# Patient Record
Sex: FEMALE | Race: WHITE | NOT HISPANIC OR LATINO | Employment: PART TIME | URBAN - METROPOLITAN AREA
[De-identification: names, ages, dates, MRNs, and addresses within clinical notes are randomized per-mention and may not be internally consistent; named-entity substitution may affect disease eponyms.]

---
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
## 2021-02-02 ENCOUNTER — ON CAMPUS - OUTPATIENT (AMBULATORY)
Dept: URBAN - METROPOLITAN AREA HOSPITAL 2 | Facility: HOSPITAL | Age: 21
End: 2021-02-02
Payer: OTHER GOVERNMENT

## 2021-02-02 VITALS
DIASTOLIC BLOOD PRESSURE: 51 MMHG | OXYGEN SATURATION: 98 % | DIASTOLIC BLOOD PRESSURE: 64 MMHG | DIASTOLIC BLOOD PRESSURE: 80 MMHG | HEART RATE: 97 BPM | HEART RATE: 85 BPM | TEMPERATURE: 98.4 F | HEART RATE: 87 BPM | SYSTOLIC BLOOD PRESSURE: 133 MMHG | DIASTOLIC BLOOD PRESSURE: 78 MMHG | DIASTOLIC BLOOD PRESSURE: 68 MMHG | OXYGEN SATURATION: 100 % | WEIGHT: 133 LBS | HEIGHT: 64 IN | SYSTOLIC BLOOD PRESSURE: 101 MMHG | SYSTOLIC BLOOD PRESSURE: 93 MMHG | SYSTOLIC BLOOD PRESSURE: 115 MMHG | DIASTOLIC BLOOD PRESSURE: 59 MMHG | HEART RATE: 67 BPM | SYSTOLIC BLOOD PRESSURE: 100 MMHG | SYSTOLIC BLOOD PRESSURE: 102 MMHG | RESPIRATION RATE: 16 BRPM | HEART RATE: 96 BPM | DIASTOLIC BLOOD PRESSURE: 69 MMHG | SYSTOLIC BLOOD PRESSURE: 111 MMHG | HEART RATE: 64 BPM | OXYGEN SATURATION: 99 %

## 2021-02-02 DIAGNOSIS — K64.0 FIRST DEGREE HEMORRHOIDS: ICD-10-CM

## 2021-02-02 DIAGNOSIS — K59.00 CONSTIPATION, UNSPECIFIED: ICD-10-CM

## 2021-02-02 PROCEDURE — 45378 DIAGNOSTIC COLONOSCOPY: CPT | Mod: 33 | Performed by: INTERNAL MEDICINE

## 2021-02-02 RX ORDER — LACTULOSE 10 G/15ML
600 SOLUTION ORAL
Qty: 1800 | Refills: 11 | Status: ACTIVE
Start: 2021-02-02

## 2021-02-02 RX ORDER — PRUCALOPRIDE 2 MG/1
2 TABLET, FILM COATED ORAL
Qty: 30 | Refills: 11 | Status: COMPLETED
Start: 2021-02-02 | End: 2021-02-23

## 2021-02-02 RX ORDER — LINACLOTIDE 290 UG/1
290 CAPSULE, GELATIN COATED ORAL
Qty: 30 | Refills: 6 | Status: ACTIVE
Start: 2021-02-02

## 2021-02-23 ENCOUNTER — OFFICE (AMBULATORY)
Dept: URBAN - METROPOLITAN AREA CLINIC 64 | Facility: CLINIC | Age: 21
End: 2021-02-23
Payer: OTHER GOVERNMENT

## 2021-02-23 VITALS
WEIGHT: 140 LBS | HEIGHT: 64 IN | DIASTOLIC BLOOD PRESSURE: 91 MMHG | HEART RATE: 81 BPM | SYSTOLIC BLOOD PRESSURE: 125 MMHG

## 2021-02-23 DIAGNOSIS — K59.00 CONSTIPATION, UNSPECIFIED: ICD-10-CM

## 2021-02-23 PROCEDURE — 99213 OFFICE O/P EST LOW 20 MIN: CPT | Performed by: INTERNAL MEDICINE

## 2021-03-11 ENCOUNTER — PATIENT MESSAGE (OUTPATIENT)
Dept: FAMILY MEDICINE CLINIC | Facility: CLINIC | Age: 21
End: 2021-03-11

## 2021-03-11 DIAGNOSIS — K59.04 CHRONIC IDIOPATHIC CONSTIPATION: Primary | ICD-10-CM

## 2021-03-11 NOTE — TELEPHONE ENCOUNTER
From: Debbie Rose  To: MARGARETH Baldwin  Sent: 3/11/2021 12:54 PM EST  Subject: Referral Request    requesting another gastroenterology referral, have used both visits with dr kim and not satisfied as all he wants to do is push pills that are not workign and trying to get to the real root of the problem. I have researched  and he is an approved provider and have had friends that went to him and have seen great results.    asking for a gastro referral to shira Sesay at   [facility]  [facility] [address], [address]  [email or web address]    [place] [address]    thanks so much

## 2021-05-12 ENCOUNTER — LAB (OUTPATIENT)
Dept: FAMILY MEDICINE CLINIC | Facility: CLINIC | Age: 21
End: 2021-05-12

## 2021-05-12 ENCOUNTER — OFFICE VISIT (OUTPATIENT)
Dept: FAMILY MEDICINE CLINIC | Facility: CLINIC | Age: 21
End: 2021-05-12

## 2021-05-12 VITALS
BODY MASS INDEX: 22.88 KG/M2 | WEIGHT: 134 LBS | HEIGHT: 64 IN | DIASTOLIC BLOOD PRESSURE: 78 MMHG | HEART RATE: 101 BPM | OXYGEN SATURATION: 98 % | SYSTOLIC BLOOD PRESSURE: 118 MMHG | TEMPERATURE: 97.3 F

## 2021-05-12 DIAGNOSIS — Z01.419 ROUTINE GYNECOLOGICAL EXAMINATION: ICD-10-CM

## 2021-05-12 DIAGNOSIS — Z00.00 PREVENTATIVE HEALTH CARE: ICD-10-CM

## 2021-05-12 DIAGNOSIS — G89.29 CHRONIC PAIN OF LEFT KNEE: ICD-10-CM

## 2021-05-12 DIAGNOSIS — Z00.00 PREVENTATIVE HEALTH CARE: Primary | ICD-10-CM

## 2021-05-12 DIAGNOSIS — M25.562 CHRONIC PAIN OF LEFT KNEE: ICD-10-CM

## 2021-05-12 PROCEDURE — 99395 PREV VISIT EST AGE 18-39: CPT | Performed by: NURSE PRACTITIONER

## 2021-05-12 PROCEDURE — 85025 COMPLETE CBC W/AUTO DIFF WBC: CPT | Performed by: NURSE PRACTITIONER

## 2021-05-12 PROCEDURE — 84443 ASSAY THYROID STIM HORMONE: CPT | Performed by: NURSE PRACTITIONER

## 2021-05-12 PROCEDURE — 36415 COLL VENOUS BLD VENIPUNCTURE: CPT

## 2021-05-12 PROCEDURE — 99212 OFFICE O/P EST SF 10 MIN: CPT | Performed by: NURSE PRACTITIONER

## 2021-05-12 PROCEDURE — 80061 LIPID PANEL: CPT | Performed by: NURSE PRACTITIONER

## 2021-05-12 PROCEDURE — 80053 COMPREHEN METABOLIC PANEL: CPT | Performed by: NURSE PRACTITIONER

## 2021-05-12 NOTE — PROGRESS NOTES
"Raman Rose is a 21 y.o. female.     Chief Complaint   Patient presents with   • Annual Exam   • Gynecologic Exam       /78 (BP Location: Left arm, Patient Position: Sitting, Cuff Size: Adult)   Pulse 101   Temp 97.3 °F (36.3 °C) (Skin)   Ht 162.6 cm (64\")   Wt 60.8 kg (134 lb)   SpO2 98%   BMI 23.00 kg/m²     BP Readings from Last 3 Encounters:   05/12/21 118/78   03/20/21 105/74   02/14/21 122/80       Wt Readings from Last 3 Encounters:   05/12/21 60.8 kg (134 lb)   03/20/21 61.2 kg (135 lb) (62 %, Z= 0.30)*   02/14/21 61.2 kg (135 lb) (62 %, Z= 0.31)*     * Growth percentiles are based on [facility] (Girls, 2-20 Years) data.       Pt comes in today for routine physical and pap.   This is her first pap. LMP about 2 weeks ago. On birth control. Has been sexually active in the past, but not currently. Using condoms.     Having some left knee pain. Worse with exercise, lunges. Started about 1.5 months ago. No injuries. No swelling. No popping or locking. Has sharp pains. No pain with walking.        The following portions of the patient's history were reviewed and updated as appropriate: allergies, current medications, past family history, past medical history, past social history, past surgical history and problem list.    Review of Systems    Objective   Physical Exam  Constitutional:       Appearance: She is well-developed.   HENT:      Head: Normocephalic.   Eyes:      Conjunctiva/sclera: Conjunctivae normal.      Pupils: Pupils are equal, round, and reactive to light.   Neck:      Thyroid: No thyromegaly.   Cardiovascular:      Rate and Rhythm: Normal rate and regular rhythm.      Heart sounds: No murmur heard.     Pulmonary:      Effort: Pulmonary effort is normal.      Breath sounds: Normal breath sounds.   Abdominal:      General: Bowel sounds are normal.      Palpations: Abdomen is soft. There is no mass.      Tenderness: There is no abdominal tenderness.   Genitourinary:     Vagina: " Normal.      Cervix: Normal.      Uterus: Normal.    Musculoskeletal:      Cervical back: Neck supple.   Skin:     General: Skin is warm and dry.      Findings: No lesion.   Neurological:      Mental Status: She is alert and oriented to person, place, and time.   Psychiatric:         Behavior: Behavior normal.           Diagnoses and all orders for this visit:    1. Preventative health care (Primary)  -     CBC & Differential; Future  -     Comprehensive Metabolic Panel; Future  -     TSH; Future  -     Lipid Panel; Future  -     IGP,Aptima HPV,Age Gdln; Future    2. Routine gynecological examination  -     IGP,Aptima HPV,Age Gdln; Future    3. Chronic pain of left knee  -     XR Knee 3 View Left; Future    check labs  X-ray knee. Ice, brace, NSAIDs as needed  During this visit for their annual exam, we reviewed their personal history, social history and family history. We went over their medications and all the recommended health maintenance items for their age group. They were given the opportunity to ask questions and discuss other concerns.       Return in about 1 year (around 5/12/2022) for Annual physical.

## 2021-05-13 LAB
ALBUMIN SERPL-MCNC: 4.6 G/DL (ref 3.5–5.2)
ALBUMIN/GLOB SERPL: 1.8 G/DL
ALP SERPL-CCNC: 48 U/L (ref 39–117)
ALT SERPL W P-5'-P-CCNC: 11 U/L (ref 1–33)
ANION GAP SERPL CALCULATED.3IONS-SCNC: 10.8 MMOL/L (ref 5–15)
AST SERPL-CCNC: 18 U/L (ref 1–32)
BASOPHILS # BLD AUTO: 0.05 10*3/MM3 (ref 0–0.2)
BASOPHILS NFR BLD AUTO: 0.8 % (ref 0–1.5)
BILIRUB SERPL-MCNC: 0.7 MG/DL (ref 0–1.2)
BUN SERPL-MCNC: 10 MG/DL (ref 6–20)
BUN/CREAT SERPL: 9.9 (ref 7–25)
CALCIUM SPEC-SCNC: 9.3 MG/DL (ref 8.6–10.5)
CHLORIDE SERPL-SCNC: 104 MMOL/L (ref 98–107)
CHOLEST SERPL-MCNC: 164 MG/DL (ref 0–200)
CO2 SERPL-SCNC: 24.2 MMOL/L (ref 22–29)
CREAT SERPL-MCNC: 1.01 MG/DL (ref 0.57–1)
DEPRECATED RDW RBC AUTO: 45.6 FL (ref 37–54)
EOSINOPHIL # BLD AUTO: 0.09 10*3/MM3 (ref 0–0.4)
EOSINOPHIL NFR BLD AUTO: 1.4 % (ref 0.3–6.2)
ERYTHROCYTE [DISTWIDTH] IN BLOOD BY AUTOMATED COUNT: 12.6 % (ref 12.3–15.4)
GFR SERPL CREATININE-BSD FRML MDRD: 70 ML/MIN/1.73
GLOBULIN UR ELPH-MCNC: 2.5 GM/DL
GLUCOSE SERPL-MCNC: 76 MG/DL (ref 65–99)
HCT VFR BLD AUTO: 43.5 % (ref 34–46.6)
HDLC SERPL-MCNC: 96 MG/DL (ref 40–60)
HGB BLD-MCNC: 14.2 G/DL (ref 12–15.9)
IMM GRANULOCYTES # BLD AUTO: 0.02 10*3/MM3 (ref 0–0.05)
IMM GRANULOCYTES NFR BLD AUTO: 0.3 % (ref 0–0.5)
LDLC SERPL CALC-MCNC: 58 MG/DL (ref 0–100)
LDLC/HDLC SERPL: 0.61 {RATIO}
LYMPHOCYTES # BLD AUTO: 0.76 10*3/MM3 (ref 0.7–3.1)
LYMPHOCYTES NFR BLD AUTO: 12.1 % (ref 19.6–45.3)
MCH RBC QN AUTO: 31.9 PG (ref 26.6–33)
MCHC RBC AUTO-ENTMCNC: 32.6 G/DL (ref 31.5–35.7)
MCV RBC AUTO: 97.8 FL (ref 79–97)
MONOCYTES # BLD AUTO: 0.79 10*3/MM3 (ref 0.1–0.9)
MONOCYTES NFR BLD AUTO: 12.6 % (ref 5–12)
NEUTROPHILS NFR BLD AUTO: 4.55 10*3/MM3 (ref 1.7–7)
NEUTROPHILS NFR BLD AUTO: 72.8 % (ref 42.7–76)
NRBC BLD AUTO-RTO: 0 /100 WBC (ref 0–0.2)
PLATELET # BLD AUTO: 242 10*3/MM3 (ref 140–450)
PMV BLD AUTO: 10.9 FL (ref 6–12)
POTASSIUM SERPL-SCNC: 4 MMOL/L (ref 3.5–5.2)
PROT SERPL-MCNC: 7.1 G/DL (ref 6–8.5)
RBC # BLD AUTO: 4.45 10*6/MM3 (ref 3.77–5.28)
SODIUM SERPL-SCNC: 139 MMOL/L (ref 136–145)
TRIGL SERPL-MCNC: 46 MG/DL (ref 0–150)
TSH SERPL DL<=0.05 MIU/L-ACNC: 0.92 UIU/ML (ref 0.27–4.2)
VLDLC SERPL-MCNC: 10 MG/DL (ref 5–40)
WBC # BLD AUTO: 6.26 10*3/MM3 (ref 3.4–10.8)

## 2021-05-14 LAB
AGE GDLN ACOG TESTING: NORMAL
CYTOLOGIST CVX/VAG CYTO: NORMAL
CYTOLOGY CVX/VAG DOC CYTO: NORMAL
CYTOLOGY CVX/VAG DOC THIN PREP: NORMAL
DX ICD CODE: NORMAL
HIV 1 & 2 AB SER-IMP: NORMAL
OTHER STN SPEC: NORMAL
STAT OF ADQ CVX/VAG CYTO-IMP: NORMAL

## 2021-05-18 DIAGNOSIS — M25.562 CHRONIC PAIN OF LEFT KNEE: ICD-10-CM

## 2021-05-18 DIAGNOSIS — G89.29 CHRONIC PAIN OF LEFT KNEE: ICD-10-CM

## 2021-06-01 DIAGNOSIS — G89.29 CHRONIC PAIN OF LEFT KNEE: Primary | ICD-10-CM

## 2021-06-01 DIAGNOSIS — M25.562 CHRONIC PAIN OF LEFT KNEE: Primary | ICD-10-CM

## 2021-06-22 ENCOUNTER — HOSPITAL ENCOUNTER (OUTPATIENT)
Dept: MRI IMAGING | Facility: HOSPITAL | Age: 21
Discharge: HOME | End: 2021-06-22
Admitting: NURSE PRACTITIONER

## 2021-06-22 DIAGNOSIS — G89.29 CHRONIC PAIN OF LEFT KNEE: ICD-10-CM

## 2021-06-22 DIAGNOSIS — M25.562 CHRONIC PAIN OF LEFT KNEE: ICD-10-CM

## 2021-06-22 PROCEDURE — 73721 MRI JNT OF LWR EXTRE W/O DYE: CPT

## 2021-08-03 PROBLEM — J02.9 SORE THROAT: Status: ACTIVE | Noted: 2021-08-03

## 2021-08-03 PROCEDURE — 87147 CULTURE TYPE IMMUNOLOGIC: CPT | Performed by: FAMILY MEDICINE

## 2021-08-03 PROCEDURE — 87081 CULTURE SCREEN ONLY: CPT | Performed by: FAMILY MEDICINE

## 2021-08-04 ENCOUNTER — TELEPHONE (OUTPATIENT)
Dept: URGENT CARE | Facility: CLINIC | Age: 21
End: 2021-08-04

## 2021-09-07 PROBLEM — M79.672 PAIN IN LEFT FOOT: Status: ACTIVE | Noted: 2021-09-07

## 2021-10-26 ENCOUNTER — TELEPHONE (OUTPATIENT)
Dept: FAMILY MEDICINE CLINIC | Facility: CLINIC | Age: 21
End: 2021-10-26

## 2021-10-26 DIAGNOSIS — K59.04 CHRONIC IDIOPATHIC CONSTIPATION: Primary | ICD-10-CM

## 2021-10-26 PROCEDURE — U0004 COV-19 TEST NON-CDC HGH THRU: HCPCS | Performed by: FAMILY MEDICINE

## 2021-10-26 NOTE — TELEPHONE ENCOUNTER
DR RHOADES TRIED TO REFER PATIENT TO [facility] FOR CHRONIC CONSTIPATION BUT IT WAS DENIED BY INSURANCE. SUSAN SAID THE PCP HAD TO AUTHORIZE A PT REFERRAL.    [facility]  [phone]

## 2022-01-10 PROBLEM — R35.0 URINE FREQUENCY: Status: ACTIVE | Noted: 2022-01-10

## 2022-01-10 PROCEDURE — 87086 URINE CULTURE/COLONY COUNT: CPT | Performed by: NURSE PRACTITIONER

## 2022-01-10 PROCEDURE — 87077 CULTURE AEROBIC IDENTIFY: CPT | Performed by: NURSE PRACTITIONER

## 2022-01-10 PROCEDURE — 87186 SC STD MICRODIL/AGAR DIL: CPT | Performed by: NURSE PRACTITIONER

## 2022-01-17 RX ORDER — LEVONORGESTREL AND ETHINYL ESTRADIOL 0.1-0.02MG
1 KIT ORAL DAILY
Qty: 28 TABLET | Refills: 4 | Status: SHIPPED | OUTPATIENT
Start: 2022-01-17 | End: 2022-02-07 | Stop reason: SDUPTHER

## 2022-02-06 ENCOUNTER — PATIENT MESSAGE (OUTPATIENT)
Dept: FAMILY MEDICINE CLINIC | Facility: CLINIC | Age: 22
End: 2022-02-06

## 2022-02-07 RX ORDER — LEVONORGESTREL AND ETHINYL ESTRADIOL 0.1-0.02MG
1 KIT ORAL DAILY
Qty: 28 TABLET | Refills: 4 | Status: SHIPPED | OUTPATIENT
Start: 2022-02-07 | End: 2022-02-08 | Stop reason: SDUPTHER

## 2022-02-07 NOTE — TELEPHONE ENCOUNTER
From: Debbie Rose  To: MARGARETH Baldwin  Sent: 2022 1:58 PM EST  Subject: medication refill Vienva, last dose I have is this week    I believe my Rx for this has  looking to get a renewal if possible? I am having no side effects and tolerating well, would just like to renew. unsure if I have to have a full appointment for this or if t can simply be renewed at with a new script at [facility]?     med: VIENVA 0.1-20 MG-MCG per tablet (Started 2019)  appreciate your help and assistance on this matter      Debbie

## 2022-02-08 RX ORDER — LEVONORGESTREL AND ETHINYL ESTRADIOL 0.1-0.02MG
1 KIT ORAL DAILY
Qty: 28 TABLET | Refills: 4 | Status: SHIPPED | OUTPATIENT
Start: 2022-02-08 | End: 2022-03-02 | Stop reason: SDUPTHER

## 2022-02-08 NOTE — TELEPHONE ENCOUNTER
Caller: Debbie Rose    Relationship: Self    Best call back number: [phone]    Requested Prescriptions:   Requested Prescriptions     Pending Prescriptions Disp Refills   • Vienva 0.1-20 MG-MCG per tablet 28 tablet 4     Sig: Take 1 tablet by mouth Daily.        Pharmacy where request should be sent: [id number] - [place], [place] - [address] [place] [address] - [phone] [facility] [phone] FX       Does the patient have less than a 3 day supply:  [x] Yes  [] No    Abdi Dennison Rep   02/08/22 12:23 EST

## 2022-02-16 ENCOUNTER — OFFICE VISIT (OUTPATIENT)
Dept: FAMILY MEDICINE CLINIC | Facility: CLINIC | Age: 22
End: 2022-02-16

## 2022-02-16 ENCOUNTER — LAB (OUTPATIENT)
Dept: FAMILY MEDICINE CLINIC | Facility: CLINIC | Age: 22
End: 2022-02-16

## 2022-02-16 VITALS
HEART RATE: 99 BPM | OXYGEN SATURATION: 100 % | DIASTOLIC BLOOD PRESSURE: 78 MMHG | SYSTOLIC BLOOD PRESSURE: 108 MMHG | HEIGHT: 64 IN | BODY MASS INDEX: 22.98 KG/M2 | TEMPERATURE: 99.3 F | RESPIRATION RATE: 18 BRPM | WEIGHT: 134.6 LBS

## 2022-02-16 DIAGNOSIS — J02.9 SORE THROAT: Primary | ICD-10-CM

## 2022-02-16 DIAGNOSIS — J02.9 SORE THROAT: ICD-10-CM

## 2022-02-16 LAB
EXPIRATION DATE: NORMAL
INTERNAL CONTROL: NORMAL
Lab: NORMAL
S PYO AG THROAT QL: NEGATIVE
SARS-COV-2 ORF1AB RESP QL NAA+PROBE: NOT DETECTED

## 2022-02-16 PROCEDURE — 86665 EPSTEIN-BARR CAPSID VCA: CPT | Performed by: HOSPITALIST

## 2022-02-16 PROCEDURE — 87880 STREP A ASSAY W/OPTIC: CPT | Performed by: HOSPITALIST

## 2022-02-16 PROCEDURE — U0004 COV-19 TEST NON-CDC HGH THRU: HCPCS | Performed by: HOSPITALIST

## 2022-02-16 PROCEDURE — 85007 BL SMEAR W/DIFF WBC COUNT: CPT | Performed by: HOSPITALIST

## 2022-02-16 PROCEDURE — 99213 OFFICE O/P EST LOW 20 MIN: CPT | Performed by: HOSPITALIST

## 2022-02-16 PROCEDURE — 86664 EPSTEIN-BARR NUCLEAR ANTIGEN: CPT | Performed by: HOSPITALIST

## 2022-02-16 PROCEDURE — 85025 COMPLETE CBC W/AUTO DIFF WBC: CPT | Performed by: HOSPITALIST

## 2022-02-16 PROCEDURE — 36415 COLL VENOUS BLD VENIPUNCTURE: CPT

## 2022-02-16 RX ORDER — AMOXICILLIN AND CLAVULANATE POTASSIUM 875; 125 MG/1; MG/1
1 TABLET, FILM COATED ORAL 2 TIMES DAILY
Qty: 14 TABLET | Refills: 0 | Status: SHIPPED | OUTPATIENT
Start: 2022-02-16 | End: 2022-03-02

## 2022-02-16 NOTE — PROGRESS NOTES
"Subjective   Debbie Rose is a 21 y.o. female.     Subjective / HPI  Patient says she has noted sore throat and swelling of lymph nodes involving on the ride side for last few days. Pt denies for any fever, no nausea or vomiting, no bodyache. Pt been to urgent care, monoclonal antibody test was negative, strep test was negative too. Strep test tested again, it came out negative. Pt prescribed augmentin 875 mg po daily for one week. Will recheck her in two week, if noted no improved in lymphadenopathy, will need biopsy. Discussed with her ... verbalized understanding.    Review of Systems    Objective     /78 (BP Location: Right arm, Patient Position: Sitting, Cuff Size: Adult)   Pulse 99   Temp 99.3 °F (37.4 °C) (Temporal)   Resp 18   Ht 162.6 cm (64.02\")   Wt 61.1 kg (134 lb 9.6 oz)   LMP 02/01/2022   SpO2 100%   BMI 23.09 kg/m²      Physical Exam  Positive lymphadenopathy involving the posterior chain of right neck.  Throat .. hyperemic, no pus noted.  Chest... bilateral entry, NVB    Procedures       Assessment/Plan   Diagnoses and all orders for this visit:    1. Sore throat (Primary)  -     CBC w AUTO Differential; Future  -     EBV Antibody Profile; Future  -     POCT rapid strep A  -     COVID-19,APTIMA PANTHER(DARIANA),[facility], NP/OP SWAB IN UTM/VTM/[facility] [employer] MEDIA,24 HR TAT - Swab, Nasopharynx; Future    Other orders  -     amoxicillin-clavulanate (Augmentin) 875-125 MG per tablet; Take 1 tablet by mouth 2 (Two) Times a Day.  Dispense: 14 tablet; Refill: 0                "

## 2022-02-17 LAB
ANISOCYTOSIS BLD QL: ABNORMAL
BASOPHILS # BLD MANUAL: 0.08 10*3/MM3 (ref 0–0.2)
BASOPHILS NFR BLD MANUAL: 1 % (ref 0–1.5)
DEPRECATED RDW RBC AUTO: 39.5 FL (ref 37–54)
EOSINOPHIL # BLD MANUAL: 0.08 10*3/MM3 (ref 0–0.4)
EOSINOPHIL NFR BLD MANUAL: 1 % (ref 0.3–6.2)
ERYTHROCYTE [DISTWIDTH] IN BLOOD BY AUTOMATED COUNT: 12 % (ref 12.3–15.4)
HCT VFR BLD AUTO: 39.5 % (ref 34–46.6)
HGB BLD-MCNC: 13.4 G/DL (ref 12–15.9)
LYMPHOCYTES # BLD MANUAL: 4.25 10*3/MM3 (ref 0.7–3.1)
LYMPHOCYTES NFR BLD MANUAL: 8.2 % (ref 5–12)
MCH RBC QN AUTO: 30.8 PG (ref 26.6–33)
MCHC RBC AUTO-ENTMCNC: 33.9 G/DL (ref 31.5–35.7)
MCV RBC AUTO: 90.8 FL (ref 79–97)
MICROCYTES BLD QL: ABNORMAL
MONOCYTES # BLD: 0.68 10*3/MM3 (ref 0.1–0.9)
NEUTROPHILS # BLD AUTO: 3.24 10*3/MM3 (ref 1.7–7)
NEUTROPHILS NFR BLD MANUAL: 38.8 % (ref 42.7–76)
PLAT MORPH BLD: NORMAL
PLATELET # BLD AUTO: 184 10*3/MM3 (ref 140–450)
PMV BLD AUTO: 11.2 FL (ref 6–12)
POIKILOCYTOSIS BLD QL SMEAR: ABNORMAL
RBC # BLD AUTO: 4.35 10*6/MM3 (ref 3.77–5.28)
VARIANT LYMPHS NFR BLD MANUAL: 51 % (ref 19.6–45.3)
WBC MORPH BLD: NORMAL
WBC NRBC COR # BLD: 8.34 10*3/MM3 (ref 3.4–10.8)

## 2022-02-17 NOTE — PROGRESS NOTES
CBC and COVID 19 test looks good. Hide Biopsy Depth?: No Post-Care Instructions: I reviewed with the patient in detail post-care instructions. Patient is to keep the biopsy site dry overnight, and then apply bacitracin twice daily until healed. Patient may apply hydrogen peroxide soaks to remove any crusting. Electrodesiccation And Curettage Text: The wound bed was treated with electrodesiccation and curettage after the biopsy was performed. Dressing: bandage Wound Care: Polysporin ointment Size Of Lesion In Cm: 0 Biopsy Method: double edge Personna blade Detail Level: Detailed Was A Bandage Applied: Yes Anesthesia Type: 1% lidocaine without epinephrine Hemostasis: Drysol Type Of Destruction Used: Curettage Biopsy Type: H and E Electrodesiccation Text: The wound bed was treated with electrodesiccation after the biopsy was performed. Depth Of Biopsy: dermis Consent: Written consent was obtained and risks were reviewed including but not limited to scarring, infection, bleeding, scabbing, incomplete removal, nerve damage and allergy to anesthesia. Cryotherapy Text: The wound bed was treated with cryotherapy after the biopsy was performed. Information: Selecting Yes will display possible errors in your note based on the variables you have selected. This validation is only offered as a suggestion for you. PLEASE NOTE THAT THE VALIDATION TEXT WILL BE REMOVED WHEN YOU FINALIZE YOUR NOTE. IF YOU WANT TO FAX A PRELIMINARY NOTE YOU WILL NEED TO TOGGLE THIS TO 'NO' IF YOU DO NOT WANT IT IN YOUR FAXED NOTE. Anesthesia Volume In Cc (Will Not Render If 0): 1 Silver Nitrate Text: The wound bed was treated with silver nitrate after the biopsy was performed. Notification Instructions: Pt may call office in 1 to 2 weeks for biopsy results.  If treatment is needed, pt will be notified of biopsy results Notification Instructions: Pt may call office in 1 to 2 weeks for biopsy results. If treatment is needed, pt will be notified of biopsy results Billing Type: [employer] Billing Type: Third-Party Bill

## 2022-02-18 LAB
EBV NA IGG SER IA-ACNC: <18 U/ML (ref 0–17.9)
EBV VCA IGG SER IA-ACNC: 46.6 U/ML (ref 0–17.9)
EBV VCA IGM SER IA-ACNC: >160 U/ML (ref 0–35.9)
SERVICE CMNT-IMP: ABNORMAL

## 2022-02-18 NOTE — PROGRESS NOTES
Please call the patient regarding her abnormal result.    Patient test came out positive for EBV VCA IGM antibody, which confirm that she got infectious mononucleosis. Usually self resolving illness. Take care , drink fluids.

## 2022-03-02 ENCOUNTER — OFFICE VISIT (OUTPATIENT)
Dept: FAMILY MEDICINE CLINIC | Facility: CLINIC | Age: 22
End: 2022-03-02

## 2022-03-02 ENCOUNTER — TELEPHONE (OUTPATIENT)
Dept: FAMILY MEDICINE CLINIC | Facility: CLINIC | Age: 22
End: 2022-03-02

## 2022-03-02 VITALS
RESPIRATION RATE: 17 BRPM | HEIGHT: 64 IN | SYSTOLIC BLOOD PRESSURE: 120 MMHG | TEMPERATURE: 97.8 F | DIASTOLIC BLOOD PRESSURE: 74 MMHG | OXYGEN SATURATION: 100 % | WEIGHT: 134 LBS | HEART RATE: 88 BPM | BODY MASS INDEX: 22.88 KG/M2

## 2022-03-02 DIAGNOSIS — Z30.41 ENCOUNTER FOR SURVEILLANCE OF CONTRACEPTIVE PILLS: ICD-10-CM

## 2022-03-02 DIAGNOSIS — B27.00 EPSTEIN BARR VIRUS POSITIVE MONONUCLEOSIS SYNDROME: Primary | ICD-10-CM

## 2022-03-02 PROCEDURE — 99213 OFFICE O/P EST LOW 20 MIN: CPT | Performed by: NURSE PRACTITIONER

## 2022-03-02 RX ORDER — LEVONORGESTREL AND ETHINYL ESTRADIOL 0.1-0.02MG
1 KIT ORAL DAILY
Qty: 90 TABLET | Refills: 3 | Status: SHIPPED | OUTPATIENT
Start: 2022-03-02 | End: 2022-06-30

## 2022-03-02 NOTE — PROGRESS NOTES
"Chief Complaint  Swollen Glands  Subjective        Debbie Rose presents to [facility]  Pt comes in today for 2 week follow up. Saw Dr. Kan and diagnosed with mono. Feeling better. Glands and lymph nodes seem to be getting smaller. No concerns today.   Requesting refill on birth control.        Objective     Vital Signs:   /74   Pulse 88   Temp 97.8 °F (36.6 °C)   Resp 17   Ht 162.6 cm (64\")   Wt 60.8 kg (134 lb)   SpO2 100%   BMI 23.00 kg/m²       BP Readings from Last 3 Encounters:   03/02/22 120/74   02/16/22 108/78   02/15/22 111/80       Wt Readings from Last 3 Encounters:   03/02/22 60.8 kg (134 lb)   02/16/22 61.1 kg (134 lb 9.6 oz)   02/15/22 60.8 kg (134 lb)     Physical Exam  Constitutional:       Appearance: She is well-developed.   Eyes:      Pupils: Pupils are equal, round, and reactive to light.   Cardiovascular:      Rate and Rhythm: Normal rate and regular rhythm.   Pulmonary:      Effort: Pulmonary effort is normal.      Breath sounds: Normal breath sounds.   Neurological:      Mental Status: She is alert and oriented to person, place, and time.        Result Review :                 Assessment and Plan    Diagnoses and all orders for this visit:    1. Verónica Barr virus positive mononucleosis syndrome (Primary)  Comments:  improving     2. Encounter for surveillance of contraceptive pills    Other orders  -     Vienva 0.1-20 MG-MCG per tablet; Take 1 tablet by mouth Daily for 90 days.  Dispense: 90 tablet; Refill: 3    During this office visit, we discussed the pertinent aspects of the visit and treatment recommendations. Pt verbalizes understanding. Follow up was discussed. Patient was given the opportunity to ask questions and discuss other concerns.         Follow Up   Return if symptoms worsen or fail to improve.  Patient was given instructions and counseling regarding her condition or for health maintenance advice. Please see specific " information pulled into the AVS if appropriate.

## 2022-03-02 NOTE — TELEPHONE ENCOUNTER
Caller: TENISHA    Relationship to patient: MOTHER    Best call back number: [phone]      Type of visit:PHYSICAL , PAP    Requested date:BEFORE 5/2022    If rescheduling, when is the original appointment: 5/19/2022    Additional notes:    TENISHA WOULD LIKE TO KNOW IF HAILY'S PHYSICAL W PAP CAN BE MOVED UP. I AM NOT ABLE TO SCHEDULE PRIOR  DAYS RECENT PAP 5/12/2021

## 2022-03-04 NOTE — TELEPHONE ENCOUNTER
Spoke with patient's mother and explained that patient's last physical was 5/12/2021 so we would have to wait a year and a day for the next one.  Mom decided to leave the appt as is for now and she will call the insurance to see what can be worked out.

## 2022-06-30 ENCOUNTER — LAB (OUTPATIENT)
Dept: FAMILY MEDICINE CLINIC | Facility: CLINIC | Age: 22
End: 2022-06-30

## 2022-06-30 ENCOUNTER — OFFICE VISIT (OUTPATIENT)
Dept: FAMILY MEDICINE CLINIC | Facility: CLINIC | Age: 22
End: 2022-06-30

## 2022-06-30 VITALS
BODY MASS INDEX: 22.71 KG/M2 | TEMPERATURE: 97.7 F | HEIGHT: 64 IN | HEART RATE: 89 BPM | RESPIRATION RATE: 16 BRPM | SYSTOLIC BLOOD PRESSURE: 122 MMHG | OXYGEN SATURATION: 99 % | WEIGHT: 133 LBS | DIASTOLIC BLOOD PRESSURE: 68 MMHG

## 2022-06-30 DIAGNOSIS — Z00.00 PREVENTATIVE HEALTH CARE: Primary | ICD-10-CM

## 2022-06-30 DIAGNOSIS — Z11.1 SCREENING FOR TUBERCULOSIS: ICD-10-CM

## 2022-06-30 DIAGNOSIS — Z78.9 HEPATITIS B VACCINATION STATUS UNKNOWN: ICD-10-CM

## 2022-06-30 LAB — HBV SURFACE AB SER RIA-ACNC: NORMAL

## 2022-06-30 PROCEDURE — 90715 TDAP VACCINE 7 YRS/> IM: CPT | Performed by: NURSE PRACTITIONER

## 2022-06-30 PROCEDURE — 86480 TB TEST CELL IMMUN MEASURE: CPT | Performed by: NURSE PRACTITIONER

## 2022-06-30 PROCEDURE — 90471 IMMUNIZATION ADMIN: CPT | Performed by: NURSE PRACTITIONER

## 2022-06-30 PROCEDURE — 99395 PREV VISIT EST AGE 18-39: CPT | Performed by: NURSE PRACTITIONER

## 2022-06-30 PROCEDURE — 86706 HEP B SURFACE ANTIBODY: CPT | Performed by: NURSE PRACTITIONER

## 2022-06-30 PROCEDURE — 36415 COLL VENOUS BLD VENIPUNCTURE: CPT

## 2022-06-30 NOTE — PROGRESS NOTES
"Chief Complaint  Annual Exam  Subjective        Debbie Rose presents to [facility]  Pt comes in today for routine physical. Needing immunization update for school  utd on pap. No longer taking birth control. Didn't like the way it made her feel. Not sexually active.   No specific concerns today          Objective     Vital Signs:   /68   Pulse 89   Temp 97.7 °F (36.5 °C)   Resp 16   Ht 162.6 cm (64\")   Wt 60.3 kg (133 lb)   SpO2 99%   BMI 22.83 kg/m²       BP Readings from Last 3 Encounters:   06/30/22 122/68   03/02/22 120/74   02/16/22 108/78       Wt Readings from Last 3 Encounters:   06/30/22 60.3 kg (133 lb)   03/02/22 60.8 kg (134 lb)   02/16/22 61.1 kg (134 lb 9.6 oz)     Physical Exam  Constitutional:       Appearance: She is well-developed.   HENT:      Head: Normocephalic.   Eyes:      Conjunctiva/sclera: Conjunctivae normal.      Pupils: Pupils are equal, round, and reactive to light.   Neck:      Thyroid: No thyromegaly.   Cardiovascular:      Rate and Rhythm: Normal rate and regular rhythm.      Heart sounds: No murmur heard.  Pulmonary:      Effort: Pulmonary effort is normal.      Breath sounds: Normal breath sounds.   Abdominal:      General: Bowel sounds are normal.      Palpations: Abdomen is soft. There is no mass.      Tenderness: There is no abdominal tenderness.   Musculoskeletal:      Cervical back: Neck supple.   Skin:     General: Skin is warm and dry.      Findings: No lesion.   Neurological:      Mental Status: She is alert and oriented to person, place, and time.   Psychiatric:         Behavior: Behavior normal.        Result Review :                 Assessment and Plan    Diagnoses and all orders for this visit:    1. Preventative health care (Primary)    2. Hepatitis B vaccination status unknown  -     Hepatitis B Surface Antibody; Future    3. Screening for tuberculosis  -     QuantiFERON-TB Gold Plus (ELZA); Future    Other orders  - "     Tdap Vaccine Greater Than or Equal To 8yo IM    tdap today  Check labs  During this visit for their annual exam, we reviewed their personal history, social history and family history. We went over their medications and all the recommended health maintenance items for their age group. They were given the opportunity to ask questions and discuss other concerns.         Follow Up   Return in about 1 year (around 6/30/2023) for Annual physical.  Patient was given instructions and counseling regarding her condition or for health maintenance advice. Please see specific information pulled into the AVS if appropriate.

## 2022-07-02 LAB
GAMMA INTERFERON BACKGROUND BLD IA-ACNC: 0.01 IU/ML
M TB IFN-G BLD-IMP: NEGATIVE
M TB IFN-G CD4+ BCKGRND COR BLD-ACNC: 0 IU/ML
M TB IFN-G CD4+CD8+ BCKGRND COR BLD-ACNC: 0 IU/ML
MITOGEN IGNF BLD-ACNC: >10 IU/ML
QUANTIFERON INCUBATION: NORMAL
SERVICE CMNT-IMP: NORMAL

## 2022-07-05 ENCOUNTER — TELEPHONE (OUTPATIENT)
Dept: FAMILY MEDICINE CLINIC | Facility: CLINIC | Age: 22
End: 2022-07-05

## 2022-07-05 NOTE — TELEPHONE ENCOUNTER
Caller: Debbie Rose    Relationship to patient: Self    Best call back number: [phone]    Type of visit: HEP B BOOSTER    Additional notes:ATTEMPTED TO WARM TRANSFER

## 2022-07-05 NOTE — TELEPHONE ENCOUNTER
Spoke with patient and scheduled a [facility] nurse appt on 7/6/2022 at 11:15am.

## 2022-07-06 ENCOUNTER — CLINICAL SUPPORT (OUTPATIENT)
Dept: FAMILY MEDICINE CLINIC | Facility: CLINIC | Age: 22
End: 2022-07-06

## 2022-07-06 DIAGNOSIS — Z23 NEED FOR VACCINATION: Primary | ICD-10-CM

## 2022-07-06 PROCEDURE — 90471 IMMUNIZATION ADMIN: CPT | Performed by: NURSE PRACTITIONER

## 2022-07-06 PROCEDURE — 90746 HEPB VACCINE 3 DOSE ADULT IM: CPT | Performed by: NURSE PRACTITIONER

## 2022-08-15 ENCOUNTER — TELEPHONE (OUTPATIENT)
Dept: FAMILY MEDICINE CLINIC | Facility: CLINIC | Age: 22
End: 2022-08-15

## 2022-08-15 NOTE — TELEPHONE ENCOUNTER
Caller: Haily Rose    Relationship: Self    Best call back number:[phone]    What form or medical record are you requesting: LETTER STATING HEP B VACCINE CAN NOT BE GIVEN UNTIL September 6TH.     HAILY CAN COME IN SOONER IF OKAY TO GET SHOT INSTEAD BUT HAS TO HAVE IMMUNIZATIONS TURNED IN BEFORE 8/22/2022    Who is requesting this form or medical record from you: [facility]    How would you like to receive the form or medical records (pick-up, mail, fax):

## 2022-08-16 NOTE — TELEPHONE ENCOUNTER
Can she get the 2nd Hep B shot before 8/22/22 or can she get a note stating that she can get the shot until what ever date it is that she can get it?

## 2022-09-06 ENCOUNTER — CLINICAL SUPPORT (OUTPATIENT)
Dept: FAMILY MEDICINE CLINIC | Facility: CLINIC | Age: 22
End: 2022-09-06

## 2022-09-06 DIAGNOSIS — Z23 NEED FOR HEPATITIS VACCINATION: Primary | ICD-10-CM

## 2022-09-07 PROCEDURE — 90471 IMMUNIZATION ADMIN: CPT | Performed by: NURSE PRACTITIONER

## 2022-09-07 PROCEDURE — 90746 HEPB VACCINE 3 DOSE ADULT IM: CPT | Performed by: NURSE PRACTITIONER

## 2023-05-24 ENCOUNTER — HOSPITAL ENCOUNTER (EMERGENCY)
Facility: HOSPITAL | Age: 23
Discharge: HOME | End: 2023-05-24
Attending: EMERGENCY MEDICINE | Admitting: EMERGENCY MEDICINE
Payer: OTHER GOVERNMENT

## 2023-05-24 ENCOUNTER — APPOINTMENT (OUTPATIENT)
Dept: CT IMAGING | Facility: HOSPITAL | Age: 23
End: 2023-05-24
Payer: OTHER GOVERNMENT

## 2023-05-24 VITALS
SYSTOLIC BLOOD PRESSURE: 123 MMHG | TEMPERATURE: 98.3 F | HEART RATE: 80 BPM | HEIGHT: 64 IN | BODY MASS INDEX: 23.34 KG/M2 | DIASTOLIC BLOOD PRESSURE: 84 MMHG | OXYGEN SATURATION: 99 % | RESPIRATION RATE: 17 BRPM | WEIGHT: 136.69 LBS
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
## 2023-05-24 DIAGNOSIS — S09.90XA MINOR CLOSED HEAD INJURY: ICD-10-CM

## 2023-05-24 DIAGNOSIS — G44.311 INTRACTABLE ACUTE POST-TRAUMATIC HEADACHE: ICD-10-CM

## 2023-05-24 PROCEDURE — 63710000001 ONDANSETRON ODT 4 MG TABLET DISPERSIBLE: Performed by: NURSE PRACTITIONER

## 2023-05-24 PROCEDURE — 99283 EMERGENCY DEPT VISIT LOW MDM: CPT

## 2023-05-24 PROCEDURE — 70450 CT HEAD/BRAIN W/O DYE: CPT

## 2023-05-24 RX ORDER — ONDANSETRON 4 MG/1
4 TABLET, ORALLY DISINTEGRATING ORAL ONCE
Status: COMPLETED | OUTPATIENT
Start: 2023-05-24 | End: 2023-05-24

## 2023-05-24 RX ORDER — HYDROCODONE BITARTRATE AND ACETAMINOPHEN 5; 325 MG/1; MG/1
1 TABLET ORAL ONCE
Status: COMPLETED | OUTPATIENT
Start: 2023-05-24 | End: 2023-05-24

## 2023-05-24 RX ADMIN — HYDROCODONE BITARTRATE AND ACETAMINOPHEN 1 TABLET: 5; 325 TABLET ORAL at 02:56

## 2023-05-24 RX ADMIN — ONDANSETRON 4 MG: 4 TABLET, ORALLY DISINTEGRATING ORAL at 02:56

## 2023-05-24 NOTE — DISCHARGE INSTRUCTIONS
Follow head injury precautions and return for any profuse vomiting or worsening symptoms follow-up with primary care if still painful in 10 days    Use Voltaren gel over-the-counter for discomfort or Salonpas lidocaine patches to help with discomfort    Tylenol and Motrin for discomfort as well    Static stretches and warm compresses to the sore areas will help the discomfort.    Return if worse
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
## 2023-05-24 NOTE — Clinical Note
[facility] EMERGENCY DEPARTMENT  [address] [place] [address]  Phone: [phone]    Debbie Rose was seen and treated in our emergency department on 5/24/2023.  She may return to school on 05/25/2023.          Thank you for choosing [facility].    Linda Huber, APRN

## 2023-12-21 ENCOUNTER — PATIENT MESSAGE (OUTPATIENT)
Dept: FAMILY MEDICINE CLINIC | Facility: CLINIC | Age: 23
End: 2023-12-21
Payer: OTHER GOVERNMENT

## 2023-12-21 DIAGNOSIS — M25.562 CHRONIC PAIN OF LEFT KNEE: Primary | ICD-10-CM

## 2023-12-21 DIAGNOSIS — G89.29 CHRONIC PAIN OF LEFT KNEE: Primary | ICD-10-CM

## 2023-12-21 NOTE — TELEPHONE ENCOUNTER
From: Debbie Rose  To: Manisha Sebastian  Sent: 12/21/2023 2:35 PM EST  Subject: Left knee pain    Hey there I was Wondering if I can Get a referral for an orthopedic consult. I’m still have knee pain on my left side. I wondering If it’s nerve pain since my previous MRI didn’t show anything. Should I get another MRI as well since it’s been a few years since my last one. Thank you and happy holidays     Debbie Rose

## 2024-02-01 ENCOUNTER — OFFICE VISIT (OUTPATIENT)
Dept: FAMILY MEDICINE CLINIC | Facility: CLINIC | Age: 24
End: 2024-02-01
Payer: OTHER GOVERNMENT

## 2024-02-01 VITALS
RESPIRATION RATE: 16 BRPM | HEIGHT: 64 IN | SYSTOLIC BLOOD PRESSURE: 120 MMHG | HEART RATE: 97 BPM | WEIGHT: 134 LBS | BODY MASS INDEX: 22.88 KG/M2 | DIASTOLIC BLOOD PRESSURE: 74 MMHG | OXYGEN SATURATION: 97 %

## 2024-02-01 DIAGNOSIS — N63.13 BREAST LUMP ON RIGHT SIDE AT 7 O'CLOCK POSITION: Primary | ICD-10-CM

## 2024-02-01 PROCEDURE — 99213 OFFICE O/P EST LOW 20 MIN: CPT | Performed by: NURSE PRACTITIONER

## 2024-02-01 NOTE — PROGRESS NOTES
"Chief Complaint  Breast Mass    Subjective          Debbie Rose presents to [facility]  History of Present Illness  Is a patient of RED Sebastian NP and is previously unknown to me    She has no significant health history, no current prescription medicines or routine medicines or supplements    Has concerns about a lump in the right breast, which is tender  Has noticed it a little over 1 week ago    She denies any FH of breast cancer, her mother has told her that too much caffeine has affected her that way    She had been in the habit of having a couple of energy drinks weekly, maybe a diet soda once in a while but has since cut back on caffeine    She has not had a recent vaccination at all and specifically not on the right    She has just finished her period and she does endorse that the lump was more tender during her cycle     She endorses that it does feel a little  better and seems a little smaller at this time as well    We discussed watchful waiting vs proceeding with imaging, she prefers to go ahead with mammogram and US if needed    Review of Systems   Constitutional: Negative.  Negative for appetite change, fatigue and fever.   Respiratory: Negative.  Negative for shortness of breath.    Cardiovascular: Negative.  Negative for chest pain.   Genitourinary: Negative.  Negative for menstrual problem.        She endorses that she has just finished her period and it the breast lump was more painful during her period     Objective   Vital Signs:  /74   Pulse 97   Resp 16   Ht 162.6 cm (64.02\")   Wt 60.8 kg (134 lb)   SpO2 97%   BMI 22.99 kg/m²     BP Readings from Last 3 Encounters:   02/01/24 120/74   07/19/23 101/62   05/24/23 123/84        Wt Readings from Last 3 Encounters:   02/01/24 60.8 kg (134 lb)   07/19/23 61.3 kg (135 lb 3.2 oz)   05/24/23 62 kg (136 lb 11 oz)              Physical Exam  Vitals reviewed.   Constitutional:       Appearance: Normal " appearance.   Cardiovascular:      Rate and Rhythm: Normal rate and regular rhythm.      Heart sounds: Normal heart sounds.   Pulmonary:      Effort: Pulmonary effort is normal.      Breath sounds: Normal breath sounds.   Chest:      Comments: Palpable mass/density which is mobile along the lateral left breast around 7 o'clock, is tender with palpation  Skin:     General: Skin is warm.   Neurological:      Mental Status: She is alert and oriented to person, place, and time.        Result Review :                 Assessment and Plan    Diagnoses and all orders for this visit:    1. Breast lump on right side at 7 o'clock position (Primary)  -     Mammo Diagnostic Digital Tomosynthesis Right With CAD; Future  -     US Breast Right Limited; Future             Follow Up   Return if symptoms worsen or fail to improve.  Patient was given instructions and counseling regarding her condition or for health maintenance advice. Please see specific information pulled into the AVS if appropriate.

## 2024-02-12 ENCOUNTER — HOSPITAL ENCOUNTER (OUTPATIENT)
Dept: ULTRASOUND IMAGING | Facility: HOSPITAL | Age: 24
Discharge: HOME | End: 2024-02-12
Admitting: NURSE PRACTITIONER
Payer: OTHER GOVERNMENT

## 2024-02-12 DIAGNOSIS — N63.13 BREAST LUMP ON RIGHT SIDE AT 7 O'CLOCK POSITION: ICD-10-CM

## 2024-02-12 PROCEDURE — 76642 ULTRASOUND BREAST LIMITED: CPT

## 2024-03-05 NOTE — PROGRESS NOTES
EMG and Nerve Conduction Studies    Patient Name: Debbie Rose    Date of Study:3/8/24    Referring Provider:MICHELLE ZULUAGA    History:    LLE- KNEE PAIN    Results:    The complete report includes the data sheets.     Prior to starting the procedure, the patient's identity was verified, pertinent available records were reviewed, the nature of the procedure was explained, the appropriate site of the exam were confirmed directly with the patient, and a pre-procedure pause was performed for final verification of all the above.    1.  The left sural sensory nerve conduction study was normal.    2.  The left tibial motor and left peroneal motor nerve conduction studies were normal.    3.  EMG of the muscles examined in the left lower extremity and the L3-S1 myotomes were normal.      Impression:    This is a normal study of the left lower extremity.  There is no evidence of sensorimotor neuropathy or neurogenic change in the muscles examined in the left L3-S1 myotomes.    Electronically signed by :    Joseph Seipel, M.D.  March 8, 2024

## 2024-03-08 ENCOUNTER — PROCEDURE VISIT (OUTPATIENT)
Dept: NEUROLOGY | Facility: CLINIC | Age: 24
End: 2024-03-08
Payer: OTHER GOVERNMENT

## 2024-03-08 VITALS
DIASTOLIC BLOOD PRESSURE: 83 MMHG | HEIGHT: 64 IN | HEART RATE: 94 BPM | BODY MASS INDEX: 23.05 KG/M2 | WEIGHT: 135 LBS | SYSTOLIC BLOOD PRESSURE: 127 MMHG

## 2024-03-08 DIAGNOSIS — M79.672 PAIN IN LEFT FOOT: Primary | ICD-10-CM

## 2024-03-08 DIAGNOSIS — M25.562 CHRONIC PAIN OF LEFT KNEE: ICD-10-CM

## 2024-03-08 DIAGNOSIS — G89.29 CHRONIC PAIN OF LEFT KNEE: ICD-10-CM

## 2024-04-30 ENCOUNTER — CLINICAL SUPPORT (OUTPATIENT)
Dept: FAMILY MEDICINE CLINIC | Facility: CLINIC | Age: 24
End: 2024-04-30
Payer: OTHER GOVERNMENT

## 2024-04-30 DIAGNOSIS — Z11.1 TUBERCULOSIS SCREENING: Primary | ICD-10-CM

## 2024-04-30 PROCEDURE — 86580 TB INTRADERMAL TEST: CPT | Performed by: NURSE PRACTITIONER

## 2024-05-03 LAB
INDURATION: 0 MM (ref 0–10)
Lab: NORMAL
Lab: NORMAL
TB SKIN TEST: NEGATIVE

## 2024-07-25 ENCOUNTER — LAB (OUTPATIENT)
Dept: FAMILY MEDICINE CLINIC | Facility: CLINIC | Age: 24
End: 2024-07-25
Payer: OTHER GOVERNMENT

## 2024-07-25 ENCOUNTER — OFFICE VISIT (OUTPATIENT)
Dept: FAMILY MEDICINE CLINIC | Facility: CLINIC | Age: 24
End: 2024-07-25
Payer: OTHER GOVERNMENT

## 2024-07-25 VITALS
WEIGHT: 132.4 LBS | RESPIRATION RATE: 18 BRPM | OXYGEN SATURATION: 99 % | HEIGHT: 64 IN | HEART RATE: 75 BPM | DIASTOLIC BLOOD PRESSURE: 72 MMHG | BODY MASS INDEX: 22.61 KG/M2 | SYSTOLIC BLOOD PRESSURE: 116 MMHG

## 2024-07-25 DIAGNOSIS — Z00.00 PREVENTATIVE HEALTH CARE: ICD-10-CM

## 2024-07-25 DIAGNOSIS — Z00.00 PREVENTATIVE HEALTH CARE: Primary | ICD-10-CM

## 2024-07-25 PROCEDURE — 80053 COMPREHEN METABOLIC PANEL: CPT | Performed by: NURSE PRACTITIONER

## 2024-07-25 PROCEDURE — 80061 LIPID PANEL: CPT | Performed by: NURSE PRACTITIONER

## 2024-07-25 PROCEDURE — 99395 PREV VISIT EST AGE 18-39: CPT | Performed by: NURSE PRACTITIONER

## 2024-07-25 PROCEDURE — 36415 COLL VENOUS BLD VENIPUNCTURE: CPT

## 2024-07-25 PROCEDURE — 81241 F5 GENE: CPT | Performed by: NURSE PRACTITIONER

## 2024-07-25 PROCEDURE — 84443 ASSAY THYROID STIM HORMONE: CPT | Performed by: NURSE PRACTITIONER

## 2024-07-25 PROCEDURE — 85025 COMPLETE CBC W/AUTO DIFF WBC: CPT | Performed by: NURSE PRACTITIONER

## 2024-07-25 NOTE — PROGRESS NOTES
"Chief Complaint  Annual Exam (With pap)  Subjective        Debbie Rose presents to [facility]  History of Present Illness  Pt comes in today for routine physical and pap  LMP was 1 week ago.  Last pap last year  Mom is requesting Factor V testing.        Objective     Vital Signs:   /72   Pulse 75   Resp 18   Ht 162.6 cm (64.02\")   Wt 60.1 kg (132 lb 6.4 oz)   SpO2 99%   BMI 22.72 kg/m²       BP Readings from Last 3 Encounters:   07/25/24 116/72   03/08/24 127/83   02/21/24 141/81       Wt Readings from Last 3 Encounters:   07/25/24 60.1 kg (132 lb 6.4 oz)   03/08/24 61.2 kg (135 lb)   02/21/24 61.2 kg (135 lb)     Physical Exam  Constitutional:       Appearance: She is well-developed.   HENT:      Head: Normocephalic.   Eyes:      Conjunctiva/sclera: Conjunctivae normal.      Pupils: Pupils are equal, round, and reactive to light.   Neck:      Thyroid: No thyromegaly.   Cardiovascular:      Rate and Rhythm: Normal rate and regular rhythm.      Heart sounds: No murmur heard.  Pulmonary:      Effort: Pulmonary effort is normal.      Breath sounds: Normal breath sounds.   Abdominal:      General: Bowel sounds are normal.      Palpations: Abdomen is soft. There is no mass.      Tenderness: There is no abdominal tenderness.   Genitourinary:     Vagina: Normal.      Cervix: Normal.   Musculoskeletal:      Cervical back: Neck supple.   Skin:     General: Skin is warm and dry.      Findings: No lesion.   Neurological:      Mental Status: She is alert and oriented to person, place, and time.   Psychiatric:         Behavior: Behavior normal.        Result Review :                 Assessment and Plan    Diagnoses and all orders for this visit:    1. Preventative health care (Primary)  -     Factor 5 [place]; Future  -     CBC & Differential; Future  -     Comprehensive Metabolic Panel; Future  -     Lipid Panel; Future  -     TSH; Future  -     IGP,Aptima HPV,Age Gdln; " Future    Check labs  During this visit for their annual exam, we reviewed their personal history, social history and family history. We went over their medications and all the recommended health maintenance items for their age group. They were given the opportunity to ask questions and discuss other concerns.         Follow Up   Return in about 1 year (around 7/25/2025) for Annual physical.  Patient was given instructions and counseling regarding her condition or for health maintenance advice. Please see specific information pulled into the AVS if appropriate.       Answers submitted by the patient for this visit:  Other (Submitted on 7/25/2024)  Please describe your symptoms.: Regualr check up  Have you had these symptoms before?: No  How long have you been having these symptoms?: 1-4 days  Please list any medications you are currently taking for this condition.: None  Please describe any probable cause for these symptoms. : None  Primary Reason for Visit (Submitted on 7/25/2024)  What is the primary reason for your visit?: Other

## 2024-07-26 LAB
ALBUMIN SERPL-MCNC: 4.3 G/DL (ref 3.5–5.2)
ALBUMIN/GLOB SERPL: 1.9 G/DL
ALP SERPL-CCNC: 49 U/L (ref 39–117)
ALT SERPL W P-5'-P-CCNC: 10 U/L (ref 1–33)
ANION GAP SERPL CALCULATED.3IONS-SCNC: 9.4 MMOL/L (ref 5–15)
AST SERPL-CCNC: 17 U/L (ref 1–32)
BASOPHILS # BLD AUTO: 0.09 10*3/MM3 (ref 0–0.2)
BASOPHILS NFR BLD AUTO: 1.2 % (ref 0–1.5)
BILIRUB SERPL-MCNC: 0.3 MG/DL (ref 0–1.2)
BUN SERPL-MCNC: 8 MG/DL (ref 6–20)
BUN/CREAT SERPL: 8.7 (ref 7–25)
CALCIUM SPEC-SCNC: 9.5 MG/DL (ref 8.6–10.5)
CHLORIDE SERPL-SCNC: 104 MMOL/L (ref 98–107)
CHOLEST SERPL-MCNC: 179 MG/DL (ref 0–200)
CO2 SERPL-SCNC: 24.6 MMOL/L (ref 22–29)
CREAT SERPL-MCNC: 0.92 MG/DL (ref 0.57–1)
DEPRECATED RDW RBC AUTO: 43.1 FL (ref 37–54)
EGFRCR SERPLBLD CKD-EPI 2021: 89.9 ML/MIN/1.73
EOSINOPHIL # BLD AUTO: 0.25 10*3/MM3 (ref 0–0.4)
EOSINOPHIL NFR BLD AUTO: 3.5 % (ref 0.3–6.2)
ERYTHROCYTE [DISTWIDTH] IN BLOOD BY AUTOMATED COUNT: 12.3 % (ref 12.3–15.4)
F5 GENE MUT ANL BLD/T: NORMAL
GLOBULIN UR ELPH-MCNC: 2.3 GM/DL
GLUCOSE SERPL-MCNC: 97 MG/DL (ref 65–99)
HCT VFR BLD AUTO: 42 % (ref 34–46.6)
HDLC SERPL-MCNC: 114 MG/DL (ref 40–60)
HGB BLD-MCNC: 13.7 G/DL (ref 12–15.9)
IMM GRANULOCYTES # BLD AUTO: 0.01 10*3/MM3 (ref 0–0.05)
IMM GRANULOCYTES NFR BLD AUTO: 0.1 % (ref 0–0.5)
LDLC SERPL CALC-MCNC: 49 MG/DL (ref 0–100)
LDLC/HDLC SERPL: 0.41 {RATIO}
LYMPHOCYTES # BLD AUTO: 1.62 10*3/MM3 (ref 0.7–3.1)
LYMPHOCYTES NFR BLD AUTO: 22.4 % (ref 19.6–45.3)
MCH RBC QN AUTO: 31.4 PG (ref 26.6–33)
MCHC RBC AUTO-ENTMCNC: 32.6 G/DL (ref 31.5–35.7)
MCV RBC AUTO: 96.3 FL (ref 79–97)
MONOCYTES # BLD AUTO: 0.66 10*3/MM3 (ref 0.1–0.9)
MONOCYTES NFR BLD AUTO: 9.1 % (ref 5–12)
NEUTROPHILS NFR BLD AUTO: 4.61 10*3/MM3 (ref 1.7–7)
NEUTROPHILS NFR BLD AUTO: 63.7 % (ref 42.7–76)
NRBC BLD AUTO-RTO: 0 /100 WBC (ref 0–0.2)
PLATELET # BLD AUTO: 253 10*3/MM3 (ref 140–450)
PMV BLD AUTO: 10.8 FL (ref 6–12)
POTASSIUM SERPL-SCNC: 3.8 MMOL/L (ref 3.5–5.2)
PROT SERPL-MCNC: 6.6 G/DL (ref 6–8.5)
RBC # BLD AUTO: 4.36 10*6/MM3 (ref 3.77–5.28)
SODIUM SERPL-SCNC: 138 MMOL/L (ref 136–145)
TRIGL SERPL-MCNC: 90 MG/DL (ref 0–150)
TSH SERPL DL<=0.05 MIU/L-ACNC: 0.8 UIU/ML (ref 0.27–4.2)
VLDLC SERPL-MCNC: 16 MG/DL (ref 5–40)
WBC NRBC COR # BLD AUTO: 7.24 10*3/MM3 (ref 3.4–10.8)

## 2024-07-29 LAB
AGE GDLN ACOG TESTING: NORMAL
CONV .: NORMAL
CYTOLOGIST CVX/VAG CYTO: NORMAL
CYTOLOGY CVX/VAG DOC CYTO: NORMAL
CYTOLOGY CVX/VAG DOC THIN PREP: NORMAL
DX ICD CODE: NORMAL
Lab: NORMAL
OTHER STN SPEC: NORMAL
STAT OF ADQ CVX/VAG CYTO-IMP: NORMAL

## 2024-08-19 ENCOUNTER — TELEPHONE (OUTPATIENT)
Dept: FAMILY MEDICINE CLINIC | Facility: CLINIC | Age: 24
End: 2024-08-19
Payer: OTHER GOVERNMENT

## 2024-08-19 DIAGNOSIS — Z30.09 BIRTH CONTROL COUNSELING: Primary | ICD-10-CM

## 2024-08-19 NOTE — TELEPHONE ENCOUNTER
Caller: Debbie Rose    Relationship: Self    Best call back number: [phone]    What is the medical concern/diagnosis: BIRTH CONTROL/IUD    What specialty or service is being requested: OBGYN    What is the provider, practice or medical service name:  [facility]  [address]  [address] [place], [place] [address]    PHONE: [phone]    PLEASE CALL TO CONFIRM OR DISCUSS

## 2024-09-11 PROCEDURE — 87081 CULTURE SCREEN ONLY: CPT | Performed by: NURSE PRACTITIONER
